# Patient Record
Sex: FEMALE | Race: BLACK OR AFRICAN AMERICAN | Employment: OTHER | URBAN - METROPOLITAN AREA
[De-identification: names, ages, dates, MRNs, and addresses within clinical notes are randomized per-mention and may not be internally consistent; named-entity substitution may affect disease eponyms.]

---

## 2019-11-18 ENCOUNTER — OFFICE VISIT (OUTPATIENT)
Dept: ORTHOPEDIC SURGERY | Age: 74
End: 2019-11-18

## 2019-11-18 VITALS
HEART RATE: 94 BPM | BODY MASS INDEX: 35.1 KG/M2 | SYSTOLIC BLOOD PRESSURE: 188 MMHG | OXYGEN SATURATION: 100 % | TEMPERATURE: 97.4 F | HEIGHT: 68 IN | DIASTOLIC BLOOD PRESSURE: 104 MMHG | WEIGHT: 231.6 LBS

## 2019-11-18 DIAGNOSIS — M25.831 MASS OF JOINT OF RIGHT WRIST: ICD-10-CM

## 2019-11-18 DIAGNOSIS — R22.31 MASS OF RIGHT WRIST: ICD-10-CM

## 2019-11-18 DIAGNOSIS — M65.4 DE QUERVAIN'S TENOSYNOVITIS, RIGHT: Primary | ICD-10-CM

## 2019-11-18 DIAGNOSIS — E66.01 SEVERE OBESITY (HCC): ICD-10-CM

## 2019-11-18 DIAGNOSIS — M67.431 GANGLION CYST OF DORSUM OF RIGHT WRIST: ICD-10-CM

## 2019-11-18 NOTE — PROGRESS NOTES
Karime Bella is a 75 y.o. female right handed retiree. Worker's Compensation and legal considerations: not known. Vitals:    11/18/19 0841   BP: (!) 188/104   Pulse: 94   Temp: 97.4 °F (36.3 °C)   TempSrc: Oral   SpO2: 100%   Weight: 231 lb 9.6 oz (105.1 kg)   Height: 5' 7.5\" (1.715 m)   PainSc:   5   PainLoc: Wrist           Chief Complaint   Patient presents with    Wrist Pain     right         HPI: Patient comes in today with complaints of right wrist pain and a mass over the thumb side of the wrist.    Date of onset: Approximately 11/4/2019    Injury: No    Prior Treatment:  No    Numbness/ Tingling: No    ROS: Review of Systems - General ROS: negative  Respiratory ROS: no cough, shortness of breath, or wheezing  Cardiovascular ROS: no chest pain or dyspnea on exertion  Musculoskeletal ROS: positive for - pain in wrist - right  Neurological ROS: negative  Dermatological ROS: negative    Past Medical History:   Diagnosis Date    Back pain     Chest pain, unspecified     Diabetes ([facility] [address]     Diverticulitis     Fibroid     Hypercholesterolemia     Hypertension     Obesity     Other and unspecified hyperlipidemia     S/P complete repair of rotator cuff 2/2008    S/P lumbar fusion 8/24/2015    Extreme lateral interbody fusion L3-4, PEEK interbody device lateral fixation L3-4 with peak lateral fixation  On 8/24/2015 by Dr. Yves Barnett        Past Surgical History:   Procedure Laterality Date    HC AFTER CATARACT LASER SURG      HX BACK SURGERY  2015    L3-L4 XLIF    HX BACK SURGERY  2010    L4 to sacrum fusion by Dr. Luis Mckenzie [facility] BIOPSY BREAST STEREOTACTIC      NEUROLOGICAL PROCEDURE UNLISTED      back surgery    REPAIR ROTATOR CUFF,ACUTE         Current Outpatient Medications   Medication Sig Dispense Refill    triamcinolone acetonide (KENALOG) 10 mg/mL injection 1 mL by Intra artICUlar route once for 1 dose.  [address] DELICA 33 gauge misc       Insulin Needles, Disposable, (KRISHNA PEN NEEDLE) 32 x 5/32 \" ndle 1 Each.  cetirizine (ZYRTEC) 10 mg tablet Take 1 Tab by Mouth Daily as needed.  HUMALOG MIX 75-25 KWIKPEN 100 unit/mL (75-25) flexpen       ONETOUCH DELICA LANCETS 30 gauge misc       losartan (COZAAR) 100 mg tablet   0    ACETAMINOPHEN (TYLENOL PO) Take  by mouth.  gabapentin (NEURONTIN) 300 mg capsule Take 300 mg by mouth three (3) times daily.  ergocalciferol (VITAMIN D2) 50,000 unit capsule Take 50,000 Units by mouth every seven (7) days.  DOCUSATE SODIUM (COLACE PO) Take  by mouth daily as needed.  polyethylene glycol (MIRALAX) 17 gram packet Take 17 g by mouth as needed.  metoprolol succinate (TOPROL-XL) 50 mg XL tablet Take 1 tablet by mouth daily. 90 tablet 3    hydrochlorothiazide (MICROZIDE) 12.5 mg capsule Take 1 capsule by mouth daily. 90 capsule 3    insulin lispro protamine/insulin lispro (HUMALOG MIX 75/25) 100 unit/mL (75-25) injection 35 unit bid 1 Vial 0    canagliflozin (INVOKANA) 300 mg tab Take 300 mg by mouth.  Blood-Glucose Meter (SOLUS V2 AUDIBLE METER) misc by Does Not Apply route.  glucose blood VI test strips (SOLUS V2 TEST STRIPS) strip by Does Not Apply route See Admin Instructions.  SOLUS V2 LANCETS by Does Not Apply route.  Insulin Syringes, Disposable, 1 mL Syrg 1 Syringe by Does Not Apply route two (2) times a day. 200 Syringe 4    loratadine (CLARITIN) 10 mg tablet Take 1 Tab by mouth daily. (Patient taking differently: 10 mg as needed.) 30 Tab 5    HYDROcodone-acetaminophen (NORCO) 5-325 mg per tablet Take 1 tablet by mouth daily to twice daily as needed 45 Tab 0    amLODIPine (NORVASC) 10 mg tablet 10 mg.      BD INSULIN PEN NEEDLE UF ORIG 29 gauge X 1/2 \" ndle       HYDROcodone-acetaminophen (NORCO)  mg tablet Take 1 Tab by mouth.       HYDROmorphone (DILAUDID) 2 mg tablet Take 1-2 Tabs by mouth every six (6) hours as needed for Pain. Max Daily Amount: 16 mg. 40 Tab 0    losartan-hydrochlorothiazide (HYZAAR) 100-25 mg per tablet Take 1 tablet by mouth daily. 90 tablet 3       Allergies   Allergen Reactions    Percocet [Oxycodone-Acetaminophen] Nausea Only    Morphine Itching    Sulfa (Sulfonamide Antibiotics) Swelling         PE:     Wrist:    Tenderness L R Test L R   1st Ext Comp - + Finkelstein's - +   Snuff Box - - Rojo - -   2nd Ext Comp - - S-L Shear - -   S-L Joint - - L-T Shear - -   L-T Joint - - DRUJ Sup - -   6th Ext Comp - - DRUJ Pro - -   Ulnar Snuff - - DRUJ Grind - -   Fovea - - TFCC - -   STT Joint - - Mid-Carp Inst - -   FCR - - P-T Grind - -   Intersection - - ECU Sublux. - -      Dorsal Ganglion: 1cm 1st DC   Volar Ganglion: -      ROM: Full      Imaging:     Plain films of right wrist shows mild to moderate degenerative changes about the radiocarpal and midcarpal joints. ICD-10-CM ICD-9-CM    1. De Quervain's tenosynovitis, right M65.4 727.04 ASPIRAT/INJECTION GANGLION CYST(S)      AMB SUPPLY ORDER      TRIAMCINOLONE ACETONIDE INJ      triamcinolone acetonide (KENALOG) 10 mg/mL injection   2. Ganglion cyst of dorsum of right wrist M67.431 727.41 ASPIRAT/INJECTION GANGLION CYST(S)      AMB SUPPLY ORDER      TRIAMCINOLONE ACETONIDE INJ      triamcinolone acetonide (KENALOG) 10 mg/mL injection   3. Mass of joint of right wrist M25.831 719.63    4. Mass of right wrist R22.31 719.63 AMB POC XRAY, WRIST; COMPLETE, 3+ VIE   5.  Severe obesity ([address] E66.01 278.01        Plan:     Right first dorsal compartment And ganglion cyst injection and decompression    Right DQ brace to be worn at all times for 6 weeks    Follow-up in 2 months for reevaluation    Plan was reviewed with patient, who verbalized agreement and understanding of the plan    Flip 91 NOTE        Chart reviewed for the following:   IMartin DO, have reviewed the History, Physical and updated the Allergic reactions for [address] [place] [address] performed immediately prior to start of procedure:   Sunil MENDOSA DO, have performed the following reviews on [place] prior to the start of the procedure:            * Patient was identified by name and date of birth   * Agreement on procedure being performed was verified  * Risks and Benefits explained to the patient  * Procedure site verified and marked as necessary  * Patient was positioned for comfort  * Consent was signed and verified     Time: 09:10 AM      Date of procedure: 11/18/2019    Procedure performed by: Pedro Noble DO    Provider assisted by: Nishi Brunson LPN    Patient assisted by: self    How tolerated by patient: tolerated the procedure well with no complications    Post Procedural Pain Scale: 0 - No Hurt    Comments: none    Procedure:  After consent was obtained, using sterile technique the ganglion was prepped. Local anesthetic used: 1% lidocaine. Kenalog 5 mg and was then injected and the needle withdrawn. The procedure was well tolerated. The patient is asked to continue to rest the area for a few more days before resuming regular activities. It may be more painful for the first 1-2 days. Watch for fever, or increased swelling or persistent pain in the joint. Call or return to clinic prn if such symptoms occur or there is failure to improve as anticipated.

## 2020-01-23 ENCOUNTER — OFFICE VISIT (OUTPATIENT)
Dept: ORTHOPEDIC SURGERY | Age: 75
End: 2020-01-23

## 2020-01-23 VITALS
HEIGHT: 68 IN | TEMPERATURE: 97.7 F | WEIGHT: 232 LBS | OXYGEN SATURATION: 95 % | SYSTOLIC BLOOD PRESSURE: 202 MMHG | DIASTOLIC BLOOD PRESSURE: 113 MMHG | RESPIRATION RATE: 15 BRPM | BODY MASS INDEX: 35.16 KG/M2 | HEART RATE: 78 BPM

## 2020-01-23 DIAGNOSIS — M67.431 GANGLION CYST OF DORSUM OF RIGHT WRIST: ICD-10-CM

## 2020-01-23 DIAGNOSIS — M65.4 DE QUERVAIN'S TENOSYNOVITIS, RIGHT: Primary | ICD-10-CM

## 2020-01-23 NOTE — PROGRESS NOTES
Cash Wallace is a 75 y.o. female right handed retiree. Worker's Compensation and legal considerations: not known. Vitals:    01/23/20 0912   BP: (!) 202/113   Pulse: 78   Resp: 15   Temp: 97.7 °F (36.5 °C)   TempSrc: Oral   SpO2: 95%   Weight: 232 lb (105.2 kg)   Height: 5' 7.5\" (1.715 m)   PainSc:   8   PainLoc: Wrist           Chief Complaint   Patient presents with    Wrist Pain     right wrist pain       HPI: Patient comes in today for follow-up regarding her right first dorsal compartment injection. She reports that she wore the brace for 30 days as she thought this is how long I told her to.   She reports some continued pain and swelling over the back of the wrist.    Initial HPI: Patient comes in today with complaints of right wrist pain and a mass over the thumb side of the wrist.    Date of onset: Approximately 11/4/2019    Injury: No    Prior Treatment:  No    Numbness/ Tingling: No    ROS: Review of Systems - General ROS: negative  Respiratory ROS: no cough, shortness of breath, or wheezing  Cardiovascular ROS: no chest pain or dyspnea on exertion  Musculoskeletal ROS: positive for - pain in wrist - right  Neurological ROS: negative  Dermatological ROS: negative    Past Medical History:   Diagnosis Date    Back pain     Chest pain, unspecified     [facility] ([facility] [address]     Diverticulitis     Fibroid     Hypercholesterolemia     Hypertension     Obesity     Other and unspecified hyperlipidemia     S/P complete repair of rotator cuff 2/2008    S/P lumbar fusion 8/24/2015    Extreme lateral interbody fusion L3-4, PEEK interbody device lateral fixation L3-4 with peak lateral fixation  On 8/24/2015 by Dr. Fred Rodríguez        Past Surgical History:   Procedure Laterality Date    HC AFTER CATARACT LASER SURG      HX BACK SURGERY  2015    L3-L4 XLIF    HX BACK SURGERY  2010    L4 to sacrum fusion by Dr. Keegan Wahl [facility] BIOPSY BREAST STEREOTACTIC      NEUROLOGICAL PROCEDURE UNLISTED      back surgery    REPAIR ROTATOR CUFF,ACUTE         Current Outpatient Medications   Medication Sig Dispense Refill    triamcinolone acetonide (KENALOG) 10 mg/mL injection 1 mL by Intra artICUlar route once for 1 dose. 1 Vial 0    HYDROcodone-acetaminophen (NORCO) 5-325 mg per tablet Take 1 tablet by mouth daily to twice daily as needed 45 Tab 0    ONE TOUCH DELICA 33 gauge misc       Insulin Needles, Disposable, (KRISHNA PEN NEEDLE) 32 x 5/32 \" ndle 1 Each.  cetirizine (ZYRTEC) 10 mg tablet Take 1 Tab by Mouth Daily as needed.  amLODIPine (NORVASC) 10 mg tablet 10 mg.      ONETOUCH DELICA LANCETS 30 gauge misc       losartan (COZAAR) 100 mg tablet   0    BD INSULIN PEN NEEDLE UF ORIG 29 gauge X 1/2 \" ndle       HYDROcodone-acetaminophen (NORCO)  mg tablet Take 1 Tab by mouth.  ACETAMINOPHEN (TYLENOL PO) Take  by mouth.  gabapentin (NEURONTIN) 300 mg capsule Take 300 mg by mouth three (3) times daily.  HYDROmorphone (DILAUDID) 2 mg tablet Take 1-2 Tabs by mouth every six (6) hours as needed for Pain. Max Daily Amount: 16 mg. 40 Tab 0    ergocalciferol (VITAMIN D2) 50,000 unit capsule Take 50,000 Units by mouth every seven (7) days.  DOCUSATE SODIUM (COLACE PO) Take  by mouth daily as needed.  polyethylene glycol (MIRALAX) 17 gram packet Take 17 g by mouth as needed.  metoprolol succinate (TOPROL-XL) 50 mg XL tablet Take 1 tablet by mouth daily. 90 tablet 3    hydrochlorothiazide (MICROZIDE) 12.5 mg capsule Take 1 capsule by mouth daily. 90 capsule 3    losartan-hydrochlorothiazide (HYZAAR) 100-25 mg per tablet Take 1 tablet by mouth daily. 90 tablet 3    insulin lispro protamine/insulin lispro (HUMALOG MIX 75/25) 100 unit/mL (75-25) injection 35 unit bid 1 Vial 0    canagliflozin (INVOKANA) 300 mg tab Take 300 mg by mouth.  SOLUS V2 LANCETS by Does Not Apply route.       Insulin Syringes, Disposable, 1 mL Syrg 1 Syringe by Does Not Apply route two (2) times a day. 200 Syringe 4    loratadine (CLARITIN) 10 mg tablet Take 1 Tab by mouth daily. (Patient taking differently: 10 mg as needed.) 30 Tab 5    HUMALOG MIX 75-25 KWIKPEN 100 unit/mL (75-25) flexpen       Blood-Glucose Meter (SOLUS V2 AUDIBLE METER) misc by Does Not Apply route.  glucose blood VI test strips (SOLUS V2 TEST STRIPS) strip by Does Not Apply route See Admin Instructions. Allergies   Allergen Reactions    Percocet [Oxycodone-Acetaminophen] Nausea Only    Morphine Itching    Sulfa (Sulfonamide Antibiotics) Swelling         PE:     Wrist:    Tenderness L R Test L R   1st Ext Comp - + Finkelstein's - +   Snuff Box - - Rojo - -   2nd Ext Comp - - S-L Shear - -   S-L Joint - - L-T Shear - -   L-T Joint - - DRUJ Sup - -   6th Ext Comp - - DRUJ Pro - -   Ulnar Snuff - - DRUJ Grind - -   Fovea - - TFCC - -   STT Joint - - Mid-Carp Inst - -   FCR - - P-T Grind - -   Intersection - - ECU Sublux. - -      Dorsal Ganglion: 1cm 1st DC   Volar Ganglion: -      ROM: Full      Imaging:     Plain films of right wrist shows mild to moderate degenerative changes about the radiocarpal and midcarpal joints. ICD-10-CM ICD-9-CM    1. De Quervain's tenosynovitis, right M65.4 727.04 TRIAMCINOLONE ACETONIDE INJ      triamcinolone acetonide (KENALOG) 10 mg/mL injection      INJECT TENDON ORIGIN/INSERT   2. Ganglion cyst of dorsum of right wrist M67.431 727.41 TRIAMCINOLONE ACETONIDE INJ      triamcinolone acetonide (KENALOG) 10 mg/mL injection      INJECT TENDON ORIGIN/INSERT       Plan:     I had a discussion with the patient regarding the likely need for surgery to release the first dorsal compartment as well as excised the ganglion cyst.  She says she would like to hold off on this at this time about to try another injection.     We will inject again today and I have instructed her to wear her brace for 6 weeks at all times except for showering. Follow-up in 2 months for reevaluation and surgical discussion versus exercises. Plan was reviewed with patient, who verbalized agreement and understanding of the plan    Flip Boyd NOTE        Chart reviewed for the following:   Sunil MENDOSA DO, have reviewed the History, Physical and updated the Allergic reactions for [address] [place] [address] performed immediately prior to start of procedure:   Sunil MENDOSA DO, have performed the following reviews on [place] prior to the start of the procedure:            * Patient was identified by name and date of birth   * Agreement on procedure being performed was verified  * Risks and Benefits explained to the patient  * Procedure site verified and marked as necessary  * Patient was positioned for comfort  * Consent was signed and verified     Time: 09:20 AM      Date of procedure: 1/23/2020    Procedure performed by: Justine Good DO    Provider assisted by: Faby Gaston LPN    Patient assisted by: self    How tolerated by patient: tolerated the procedure well with no complications    Post Procedural Pain Scale: 0 - No Hurt    Comments: none    Procedure:  After consent was obtained, using sterile technique the ganglion was prepped. Local anesthetic used: 1% lidocaine. Kenalog 5 mg and was then injected and the needle withdrawn. The procedure was well tolerated. The patient is asked to continue to rest the area for a few more days before resuming regular activities. It may be more painful for the first 1-2 days. Watch for fever, or increased swelling or persistent pain in the joint. Call or return to clinic prn if such symptoms occur or there is failure to improve as anticipated.

## 2020-01-23 NOTE — PROGRESS NOTES
1. Have you been to the ER, urgent care clinic since your last visit? Hospitalized since your last visit? No    2. Have you seen or consulted any other health care providers outside of the [address] [place] [address] since your last visit? Include any pap smears or colon screening.  No

## 2022-03-19 PROBLEM — E66.01 SEVERE OBESITY (HCC): Status: ACTIVE | Noted: 2019-11-18

## 2023-07-21 DIAGNOSIS — M25.561 PAIN IN BOTH KNEES, UNSPECIFIED CHRONICITY: Primary | ICD-10-CM

## 2023-07-21 DIAGNOSIS — M25.562 PAIN IN BOTH KNEES, UNSPECIFIED CHRONICITY: Primary | ICD-10-CM
